# Patient Record
Sex: FEMALE | Race: WHITE | Employment: STUDENT | URBAN - METROPOLITAN AREA
[De-identification: names, ages, dates, MRNs, and addresses within clinical notes are randomized per-mention and may not be internally consistent; named-entity substitution may affect disease eponyms.]

---

## 2019-05-15 ENCOUNTER — TELEPHONE (OUTPATIENT)
Dept: OTOLARYNGOLOGY | Facility: CLINIC | Age: 29
End: 2019-05-15

## 2019-05-15 DIAGNOSIS — H60.391 INFECTIVE OTITIS EXTERNA, RIGHT: Primary | ICD-10-CM

## 2019-05-15 RX ORDER — PREDNISOLONE ACETATE 10 MG/ML
SUSPENSION/ DROPS OPHTHALMIC
Qty: 1 BOTTLE | Refills: 0 | Status: SHIPPED | OUTPATIENT
Start: 2019-05-15 | End: 2019-05-24

## 2019-05-15 RX ORDER — CIPROFLOXACIN HYDROCHLORIDE 3.5 MG/ML
SOLUTION/ DROPS TOPICAL
Qty: 1 BOTTLE | Refills: 0 | Status: SHIPPED | OUTPATIENT
Start: 2019-05-15 | End: 2019-05-24

## 2019-05-15 RX ORDER — AMOXICILLIN 875 MG
875 TABLET ORAL 2 TIMES DAILY
Qty: 20 TABLET | Refills: 0 | Status: SHIPPED | OUTPATIENT
Start: 2019-05-15 | End: 2019-05-15 | Stop reason: ALTCHOICE

## 2019-05-15 RX ORDER — OFLOXACIN 3 MG/ML
5 SOLUTION AURICULAR (OTIC) 2 TIMES DAILY
Qty: 10 ML | Refills: 1 | Status: SHIPPED | OUTPATIENT
Start: 2019-05-15 | End: 2019-05-15

## 2019-05-15 RX ORDER — SULFAMETHOXAZOLE/TRIMETHOPRIM 800-160 MG
1 TABLET ORAL 2 TIMES DAILY
Qty: 20 TABLET | Refills: 0 | Status: SHIPPED | OUTPATIENT
Start: 2019-05-15 | End: 2019-05-24

## 2019-05-15 NOTE — TELEPHONE ENCOUNTER
Spoke to pharmacist and advised per provider pt stated no allergies. Pharmacy stated pt said she had an allergy to penicillin.pharmacy stated they would fill prescription and make a note to speak to patient regarding this when prescription was picked up.

## 2019-05-15 NOTE — TELEPHONE ENCOUNTER
[facility]    Phone Message    May a detailed message be left on voicemail: yes    Reason for Call: Medication Question or concern regarding medication   Prescription Clarification  Name of Medication: amoxicillin (AMOXIL) 875 MG tablet  Prescribing Provider: Hector   Pharmacy: [facility]   What on the order needs clarification? Pharmacy needs to confirm if the Pt has an allergy to this med. Please call them to verify          Action Taken: Message routed to:  [facility] ([facility] [place]): see above

## 2019-05-24 ENCOUNTER — HOSPITAL ENCOUNTER (EMERGENCY)
Facility: CLINIC | Age: 29
Discharge: HOME | End: 2019-05-24
Attending: PHYSICIAN ASSISTANT | Admitting: PHYSICIAN ASSISTANT
Payer: COMMERCIAL

## 2019-05-24 VITALS
DIASTOLIC BLOOD PRESSURE: 88 MMHG | WEIGHT: 215 LBS | SYSTOLIC BLOOD PRESSURE: 153 MMHG | OXYGEN SATURATION: 97 % | HEART RATE: 100 BPM | TEMPERATURE: 98.5 F | RESPIRATION RATE: 16 BRPM

## 2019-05-24 DIAGNOSIS — T78.40XA ALLERGIC REACTION TO DRUG, INITIAL ENCOUNTER: ICD-10-CM

## 2019-05-24 DIAGNOSIS — L50.9 HIVES: ICD-10-CM

## 2019-05-24 PROCEDURE — 99204 OFFICE O/P NEW MOD 45 MIN: CPT | Mod: Z6 | Performed by: PHYSICIAN ASSISTANT

## 2019-05-24 PROCEDURE — G0463 HOSPITAL OUTPT CLINIC VISIT: HCPCS | Performed by: PHYSICIAN ASSISTANT

## 2019-05-24 RX ORDER — PREDNISONE 20 MG/1
TABLET ORAL
Qty: 10 TABLET | Refills: 0 | Status: SHIPPED | OUTPATIENT
Start: 2019-05-24

## 2019-05-24 ASSESSMENT — ENCOUNTER SYMPTOMS
CONSTITUTIONAL NEGATIVE: 1
GASTROINTESTINAL NEGATIVE: 1
CARDIOVASCULAR NEGATIVE: 1
RESPIRATORY NEGATIVE: 1

## 2019-05-24 NOTE — ED PROVIDER NOTES
History     Chief Complaint   Patient presents with     Rash     rash over body after taking abx     HPI  Alexandra Hoskins is a 29 year old female who presents with complaints of diffuse pruritic rash since this morning.  Patient was evaluated on 5/15/2019 and was started on Bactrim for a bilateral otitis externa.  She states she last took a dose of Bactrim last night and this was day 8 of treatment.  This morning she woke up with a diffuse, raised, red, pruritic rash across her entire body.  Denies any other new exposures.  Pt denies any other associated symptoms; denies difficulties breathing, swallowing, or the sensation of her throat closing/swelling.  Denies fevers, chills, cough, sore throat, sinus pressure, nasal congestion, nausea, vomiting, diarrhea, or abdominal pain.  Patient took Benadryl today for the pruritus.  She denies any ear pain and states those symptoms have improved.      Allergies:  Allergies   Allergen Reactions     Sulfa Drugs Hives     Penicillins        Problem List:    There are no active problems to display for this patient.       Past Medical History:    No past medical history on file.    Past Surgical History:    No past surgical history on file.    Family History:    No family history on file.    Social History:  Marital Status:  Single [1]  Social History     Tobacco Use     Smoking status: Not on file   Substance Use Topics     Alcohol use: Not on file     Drug use: Not on file        Medications:      predniSONE (DELTASONE) 20 MG tablet         Review of Systems   Constitutional: Negative.    HENT: Negative.    Respiratory: Negative.    Cardiovascular: Negative.    Gastrointestinal: Negative.    Skin: Positive for rash.   All other systems reviewed and are negative.      Physical Exam   BP: 153/88  Pulse: 100  Temp: 98.5  F (36.9  C)  Resp: 16  Weight: 97.5 kg (215 lb)  SpO2: 97 %      Physical Exam   Constitutional: She is oriented to person, place, and time. She appears  well-developed and well-nourished.  Non-toxic appearance. No distress.   HENT:   Head: Normocephalic and atraumatic.   Right Ear: Hearing, tympanic membrane, external ear and ear canal normal.   Left Ear: Hearing, tympanic membrane, external ear and ear canal normal.   Nose: Nose normal. No rhinorrhea.   Mouth/Throat: Uvula is midline and oropharynx is clear and moist. No uvula swelling. No oropharyngeal exudate, posterior oropharyngeal edema, posterior oropharyngeal erythema or tonsillar abscesses.   Eyes: Pupils are equal, round, and reactive to light. Conjunctivae and EOM are normal.   Neck: Normal range of motion. Neck supple. No neck rigidity.   Cardiovascular: Normal rate, regular rhythm and normal heart sounds.   Pulmonary/Chest: Effort normal and breath sounds normal. No stridor. No respiratory distress. She has no decreased breath sounds. She has no wheezes. She has no rhonchi. She has no rales.   Lymphadenopathy:     She has no cervical adenopathy.   Neurological: She is alert and oriented to person, place, and time.   Skin: Skin is warm and dry. Rash (diffuse erythematous coalescing urticarial rash across body) noted.       ED Course        Procedures    No results found for this or any previous visit (from the past 24 hour(s)).    Medications - No data to display    Assessments & Plan (with Medical Decision Making)     Pt is a 29 year old female who presents with complaints of diffuse pruritic rash since this morning.  Patient was evaluated on 5/15/2019 and was started on Bactrim for a bilateral otitis externa.  She states she last took a dose of Bactrim last night and this was day 8 of treatment.  This morning she woke up with a diffuse, raised, red, pruritic rash across her entire body.  Denies any other new exposures.  She denies any ear pain and states those symptoms have improved.  Pt is afebrile on arrival.  Exam as above.  Pt's rash appears to be allergic/drug reaction in origin.  Her otitis  externa appears to have resolved so no need for further antibiotics.  Instructed patient to discontinue her Bactrim as this is likely causing her rash.  No signs of anaphylaxis.  Return precautions were reviewed.  Hand-outs were provided.    Patient was sent with Prednisone burst and was instructed to follow-up with PCP if no improvement in 5-7 days for continued care and management or sooner if new or worsening symptoms.  She is to return to the ED for persistent and/or worsening symptoms.  Patient expressed understanding of the diagnosis and plan and was discharged home in good condition.    I have reviewed the nursing notes.    I have reviewed the findings, diagnosis, plan and need for follow up with the patient.       Medication List      Started    predniSONE 20 MG tablet  Commonly known as:  DELTASONE  Take two tablets (= 40mg) each day for 5 (five) days        Discontinued    sulfamethoxazole-trimethoprim 800-160 MG tablet  Commonly known as:  BACTRIM DS/SEPTRA DS            Final diagnoses:   Allergic reaction to drug, initial encounter   Hives       5/24/2019   [facility] EMERGENCY DEPARTMENT      Disclaimer:  This note consists of symbols derived from keyboarding, dictation and/or voice recognition software.  As a result, there may be errors in the script that have gone undetected.  Please consider this when interpreting information found in this chart.     Noni Treviño PA-C  05/24/19 [id number]

## 2019-05-24 NOTE — ED AVS SNAPSHOT
[facility] Emergency Department  [address] [address]  Phone:  [phone]  Fax:  [phone]                                    Alexandra Hoskins   MRN: [id number]    Department:  [facility] Emergency Department   Date of Visit:  5/24/2019           After Visit Summary Signature Page    I have received my discharge instructions, and my questions have been answered. I have discussed any challenges I see with this plan with the nurse or doctor.    ..........................................................................................................................................  Patient/Patient Representative Signature      ..........................................................................................................................................  Patient Representative Print Name and Relationship to Patient    ..................................................               ................................................  Date                                   Time    ..........................................................................................................................................  Reviewed by Signature/Title    ...................................................              ..............................................  Date                                               Time          22EPIC Rev 08/18

## 2019-05-25 ENCOUNTER — NURSE TRIAGE (OUTPATIENT)
Dept: NURSING | Facility: CLINIC | Age: 29
End: 2019-05-25

## 2019-05-25 NOTE — TELEPHONE ENCOUNTER
She was seen in the ED yesterday for allergic reaction to a drug. Had hives. Today after her shower her arms and legs started to itch a lot. Took benadryl almost an hour ago and applied caladryl lotion. Has not helped much yet. Advised to give benadryl another 1/2 hour, try cool pack or cool washcloth to itchiest areas to help calm it down. Not to use caladryl in large areas while taking benadryl.  Denies new symptoms of SOB, swelling or spreading hives. To be seen if home care not helpful, symptoms worsen or if new symptoms appear. Patient voices understanding and is in agreement with this plan.     Valery Manuel RN, [facility]        Reason for Disposition    [1] MODERATE-SEVERE widespread itching (i.e., interferes with sleep, normal activities or school) AND [2] not improved after 24 hours of itching Care Advice    Additional Information    Negative: [1] Life-threatening reaction (anaphylaxis) in the past to similar substance (e.g., food, insect bite/sting, chemical, etc.) AND [2] < 2 hours since exposure    Negative: Difficulty breathing or wheezing    Negative: [1] Difficulty swallowing or slurred speech AND [2] sudden onset    Negative: Sounds like a life-threatening emergency to the triager    Negative: Could be severe allergic reaction    Negative: Insect bites suspected    Negative: Looks like hives (pink bumps with pale centers)    Negative: Yellowish color of the skin AND sclera (white of the eye)    Negative: Itching in just one area or spot    Negative: Widespread rash also present    Negative: Patient sounds very sick or weak to the triager    Protocols used: ITCHING - WIDESPREAD-A-

## 2021-02-28 ENCOUNTER — HEALTH MAINTENANCE LETTER (OUTPATIENT)
Age: 31
End: 2021-02-28

## 2021-10-03 ENCOUNTER — HEALTH MAINTENANCE LETTER (OUTPATIENT)
Age: 31
End: 2021-10-03

## 2022-03-20 ENCOUNTER — HEALTH MAINTENANCE LETTER (OUTPATIENT)
Age: 32
End: 2022-03-20

## 2022-09-10 ENCOUNTER — HEALTH MAINTENANCE LETTER (OUTPATIENT)
Age: 32
End: 2022-09-10

## 2023-04-30 ENCOUNTER — HEALTH MAINTENANCE LETTER (OUTPATIENT)
Age: 33
End: 2023-04-30